# Patient Record
Sex: MALE | Race: BLACK OR AFRICAN AMERICAN
[De-identification: names, ages, dates, MRNs, and addresses within clinical notes are randomized per-mention and may not be internally consistent; named-entity substitution may affect disease eponyms.]

---

## 2020-03-13 ENCOUNTER — HOSPITAL ENCOUNTER (EMERGENCY)
Dept: HOSPITAL 74 - FER | Age: 55
Discharge: HOME | End: 2020-03-13
Payer: COMMERCIAL

## 2020-03-13 VITALS — HEART RATE: 85 BPM | SYSTOLIC BLOOD PRESSURE: 142 MMHG | DIASTOLIC BLOOD PRESSURE: 75 MMHG

## 2020-03-13 VITALS — TEMPERATURE: 97.8 F

## 2020-03-13 VITALS — BODY MASS INDEX: 28.1 KG/M2

## 2020-03-13 DIAGNOSIS — M10.071: ICD-10-CM

## 2020-03-13 DIAGNOSIS — R01.1: ICD-10-CM

## 2020-03-13 DIAGNOSIS — D50.9: ICD-10-CM

## 2020-03-13 DIAGNOSIS — R42: Primary | ICD-10-CM

## 2020-03-13 LAB
ALBUMIN SERPL-MCNC: 4.5 G/DL (ref 3.4–5)
ALP SERPL-CCNC: 63 U/L (ref 45–117)
ALT SERPL-CCNC: 19 U/L (ref 13–61)
ANION GAP SERPL CALC-SCNC: 5 MMOL/L (ref 8–16)
AST SERPL-CCNC: 17 U/L (ref 15–37)
BASOPHILS # BLD: 4.3 % (ref 0–2)
BILIRUB SERPL-MCNC: 1 MG/DL (ref 0.2–1)
BUN SERPL-MCNC: 19 MG/DL (ref 7–18)
CALCIUM SERPL-MCNC: 9 MG/DL (ref 8.5–10)
CHLORIDE SERPL-SCNC: 103 MMOL/L (ref 98–107)
CO2 SERPL-SCNC: 26 MMOL/L (ref 21–32)
CREAT SERPL-MCNC: 1.2 MG/DL (ref 0.55–1.3)
DEPRECATED RDW RBC AUTO: 12.3 % (ref 11.9–15.9)
EOSINOPHIL # BLD: 0.1 % (ref 0–4.5)
GLUCOSE SERPL-MCNC: 132 MG/DL (ref 74–106)
HCT VFR BLD CALC: 44.2 % (ref 35.4–49)
HGB BLD-MCNC: 14.1 GM/DL (ref 11.7–16.9)
LYMPHOCYTES # BLD: 11.8 % (ref 8–40)
MCH RBC QN AUTO: 28.4 PG (ref 25.7–33.7)
MCHC RBC AUTO-ENTMCNC: 31.9 G/DL (ref 32–35.9)
MCV RBC: 88.9 FL (ref 80–96)
MONOCYTES # BLD AUTO: 4.2 % (ref 3.8–10.2)
NEUTROPHILS # BLD: 79.6 % (ref 42.8–82.8)
PLATELET # BLD AUTO: 161 K/MM3 (ref 134–434)
PMV BLD: 9.6 FL (ref 7.5–11.1)
POTASSIUM SERPLBLD-SCNC: 4.2 MMOL/L (ref 3.5–5.1)
PROT SERPL-MCNC: 7.7 G/DL (ref 6.4–8.2)
RBC # BLD AUTO: 4.97 M/MM3 (ref 4–5.6)
SODIUM SERPL-SCNC: 134 MMOL/L (ref 136–145)
URATE SERPL-SCNC: 7.4 MG/DL (ref 2.6–7.2)
WBC # BLD AUTO: 10.3 K/MM3 (ref 4–10.8)

## 2020-03-13 NOTE — PDOC
History of Present Illness





- General


Chief Complaint: Syncope/Near Syncope


Stated Complaint: SYNCOPE


Time Seen by Provider: 03/13/20 11:19





- History of Present Illness


Initial Comments: 





03/13/20 11:20


Chief complaint: Head injury.  Had appointment with his primary physician 

because of pain in his right great toe





HPI: Patient was being examined by his doctor at doctor's office, felt faint and

passed out, falling from the stretcher and striking his head on the floor.  Woke

up immediately, no confusion, visual or focal neurologic symptoms, or headache. 

Has had pain in his right great toe for a few days.  No specific injury.  No 

swelling redness or other sign of inflammation.  No history of gout or elevated 

uric acid.  No diuretics.  Does not eat an excessive amount of red meat or organ

meats.





Review of systems: Denies fever/chills, headache, sore throat, cough, chest 

pain, shortness of breath, abdominal pain, nausea, vomiting, diarrhea, visual or

focal neurologic symptoms, unsteadiness of gait.  Although striking his head on 

the floor when he fell, he denies a headache or neck pain





Past medical history: Healthy male, no active medical problems.





Social/family history reviewed and noncontributory





Physical exam: Alert and oriented well-developed well-nourished no acute 

distress cheerful and cooperative.  There is no confusion or drowsiness


Afebrile, vital signs normal


Head without visible or palpable sign of trauma


PERRLA 4 mm, fundi benign with sharp disc margins and good central venous 

pulsations, ENT clear


Neck without tenderness or deformity, full range of motion without pain


Chest clear, full breath sounds bilaterally, no rib cage or chest wall deformity

or point tenderness


CV regular without murmur rub or gallop


Abdomen soft nontender without mass organomegaly


Neurological C2 to 12 intact.  Strength full and symmetric.  No focal sensory or

motor deficits.  Gait stable and unimpaired.


Skin clear, no rash, adequate turgor and wet mucous membranes





Impression: Vasovagal syncope, minor head injury, no sign of significant 

intracranial injury or neurologic deficit.  Elevated uric acid suggest possible 

gout.





Plan: EKG and head CT.  CBC chemistries and uric acid.  Further evaluation and 

treatment depending on results.


03/13/20 11:33





03/13/20 12:44








Past History





- Past Medical History


Allergies/Adverse Reactions: 


                                    Allergies











Allergy/AdvReac Type Severity Reaction Status Date / Time


 


No Known Allergies Allergy   Verified 03/13/20 11:15











Home Medications: 


Ambulatory Orders





Ibuprofen 800 mg PO TID #20 tablet 03/13/20 








Anemia: Yes (IRON DEFICEINCY)


Asthma: No


Cancer: No


Cardiac Disorders: Yes (MURMUR)


CVA: No


COPD: No


CHF: No


Dementia: No


Diabetes: No


GI Disorders: Yes (POLYPS)


 Disorders: No


HTN: No


Hypercholesterolemia: No


Liver Disease: No


Seizures: No


Thyroid Disease: No





- Surgical History


Abdominal Surgery: No


Appendectomy: No


Cardiac Surgery: No


Cholecystectomy: No


Lung Surgery: No


Neurologic Surgery: No


Orthopedic Surgery: Yes (FX LEFT ARM, CASTED IN ER)





- Psycho Social/Smoking Cessation Hx


Smoking History: Never smoked


Have you smoked in the past 12 months: No


Hx Alcohol Use: Yes (WINE SOCIALLY)


Drug/Substance Use Hx: No


Substance Use Type: None


Hx Substance Use Treatment: No





ED Treatment Course





- LABORATORY


CBC & Chemistry Diagram: 


                                 03/13/20 11:45





                                 03/13/20 11:45





Medical Decision Making





- Medical Decision Making





03/13/20 11:25


EKG: Normal sinus rhythm 74/min.  Normal axes and intervals.  No ST-T wave 

changes.  Normal EKG


03/13/20 12:45


CBC and chemistries including cardiac enzymes without significant abnormalities





Most likely diagnoses are vasovagal syncope and gout.  Medication as directed 

and follow-up with primary physician.  Discharged in no significant pain or 

other distress with wife to follow-up as directed





Discharge





- Discharge Information


Problems reviewed: Yes


Clinical Impression/Diagnosis: 


 Vasovagal syncope





Gout


Qualifiers:


 Gout site: toe Gout etiology: idiopathic Chronicity: acute Laterality: right 

Qualified Code(s): M10.071 - Idiopathic gout, right ankle and foot





Condition: Stable


Disposition: HOME





- Admission


No





- Additional Discharge Information


Prescriptions: 


Ibuprofen 800 mg PO TID #20 tablet





- Follow up/Referral


Referrals: 


Ren Ladd MD [Primary Care Provider] - 1 week





- Patient Discharge Instructions


Patient Printed Discharge Instructions:  DI for Syncope in Adults (Fainting), 

Low-Purine Diet, DI for Gout


Additional Instructions: 


Testing indicates you had a fainting episode without any significant injury and 

no sign of heart or neurological disease.





Blood test reveals an elevated uric acid which makes it possible that you have 

gout as the cause of your foot pain.  Take anti-inflammatory as directed, modify

diet, and follow-up with Dr. Escobar.  You should notice improvement in your 

pain after 2 or 3 days.





- Post Discharge Activity

## 2020-03-14 NOTE — EKG
Test Reason : 

Blood Pressure : ***/*** mmHG

Vent. Rate : 074 BPM     Atrial Rate : 074 BPM

   P-R Int : 138 ms          QRS Dur : 084 ms

    QT Int : 358 ms       P-R-T Axes : 072 003 046 degrees

   QTc Int : 397 ms

 

NORMAL SINUS RHYTHM

NORMAL ECG

NO PREVIOUS ECGS AVAILABLE

Confirmed by MD Rian, Sharif ([id number]) on 3/14/2020 11:29:26 AM

 

Referred By: INDU SERRATO           Confirmed By:Sharif Modi MD